# Patient Record
(demographics unavailable — no encounter records)

---

## 2025-03-18 NOTE — ASSESSMENT
[FreeTextEntry1] : The patient is a 90-year-old female with chief complaint of right shoulder pain.  She is right-hand dominant.  She has had pain in the right shoulder for several years which is gotten worse in the last few months.  She cannot lift her arm overhead and has pain both at rest and at night.  She has decreased function.  She has had no treatment.  There was no injury or overuse.  Examination of the right upper extremity reveals normal neurovascular exam.  Examination of the right shoulder reveals forward elevation to 60 degrees, external rotation to 30 degrees, and internal rotation L5.  There is marked crepitation and pain through the arc of motion.  There is 3/5 strength of the rotator cuff with normal deltoid function.  There is no pain or deformity over the AC joint.  There is no instability or apprehension.  There is no redness, rashes or visible scars.  X-rays done in the office today of the right shoulder 3 views including internal and external rotation views and a weighted view show bone-on-bone arthritis of the glenohumeral joint with no proximal migration.  There are no obvious tumors, mass or calcifications seen.  X-rays done in the office today of the right scapula 2 views including axillary view and scapular Y view show bone-on-bone arthritis of the glenohumeral joint.  There is a type II acromion.  There is no proximal migration.  There are no obvious tumors, mass or calcifications seen.  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  Under sterile conditions the right shoulder was injected intra-articularly with 5 cc of quarter percent plain Marcaine 5 cc of 2% plain lidocaine and 80 mg of Depo-Medrol.  The patient tolerated the procedure well.  Plan at this time is for meloxicam 7.5 mg once daily as needed.  She will modify her activities.  She most likely needs a reverse right total shoulder replacement, however, due to her age that would be a last resort.  I will see her back in the office as needed.

## 2025-03-18 NOTE — HISTORY OF PRESENT ILLNESS
[8] : 8 [Constant] : constant [Rest] : rest [] : no [FreeTextEntry5] : 91 y/o F presents for RT shoulder pain . had pain for a few months now. pt has trouble moving arm  [FreeTextEntry7] : down to the elbow

## 2025-03-18 NOTE — HISTORY OF PRESENT ILLNESS
[8] : 8 [Constant] : constant [Rest] : rest [] : no [FreeTextEntry5] : 89 y/o F presents for RT shoulder pain . had pain for a few months now. pt has trouble moving arm  [FreeTextEntry7] : down to the elbow